# Patient Record
Sex: FEMALE | Race: BLACK OR AFRICAN AMERICAN | Employment: OTHER | URBAN - METROPOLITAN AREA
[De-identification: names, ages, dates, MRNs, and addresses within clinical notes are randomized per-mention and may not be internally consistent; named-entity substitution may affect disease eponyms.]

---

## 2016-06-06 LAB — LDL-C, EXTERNAL: 41

## 2017-02-15 ENCOUNTER — TELEPHONE (OUTPATIENT)
Dept: FAMILY MEDICINE CLINIC | Facility: CLINIC | Age: 82
End: 2017-02-15

## 2017-02-15 RX ORDER — CARVEDILOL 12.5 MG/1
TABLET ORAL
Qty: 60 TAB | Refills: 0 | Status: SHIPPED | OUTPATIENT
Start: 2017-02-15 | End: 2017-09-19 | Stop reason: SDUPTHER

## 2017-02-15 NOTE — TELEPHONE ENCOUNTER
Called about concerns of abnormal U/A. [facility] which will be resolved.

## 2017-03-10 ENCOUNTER — OFFICE VISIT (OUTPATIENT)
Dept: FAMILY MEDICINE CLINIC | Facility: CLINIC | Age: 82
End: 2017-03-10

## 2017-03-10 VITALS
BODY MASS INDEX: 29.33 KG/M2 | HEIGHT: 62 IN | DIASTOLIC BLOOD PRESSURE: 86 MMHG | HEART RATE: 70 BPM | SYSTOLIC BLOOD PRESSURE: 140 MMHG | RESPIRATION RATE: 15 BRPM | TEMPERATURE: 98 F | WEIGHT: 159.4 LBS | OXYGEN SATURATION: 91 %

## 2017-03-10 DIAGNOSIS — Z00.00 MEDICARE ANNUAL WELLNESS VISIT, SUBSEQUENT: ICD-10-CM

## 2017-03-10 DIAGNOSIS — E78.5 HYPERLIPIDEMIA, UNSPECIFIED HYPERLIPIDEMIA TYPE: ICD-10-CM

## 2017-03-10 DIAGNOSIS — N18.4 CKD (CHRONIC KIDNEY DISEASE), STAGE 4 (SEVERE): ICD-10-CM

## 2017-03-10 DIAGNOSIS — E07.9 THYROID MASS: ICD-10-CM

## 2017-03-10 DIAGNOSIS — I10 ESSENTIAL HYPERTENSION: Primary | ICD-10-CM

## 2017-03-10 DIAGNOSIS — I42.9 CARDIOMYOPATHY (HCC): ICD-10-CM

## 2017-03-10 PROBLEM — N18.9 CKD (CHRONIC KIDNEY DISEASE): Status: ACTIVE | Noted: 2017-03-10

## 2017-03-10 RX ORDER — ATORVASTATIN CALCIUM 10 MG/1
TABLET, FILM COATED ORAL
Qty: 90 TAB | Refills: 3 | Status: SHIPPED | OUTPATIENT
Start: 2017-03-10 | End: 2017-09-19 | Stop reason: CLARIF

## 2017-03-10 NOTE — PATIENT INSTRUCTIONS
Schedule of Personalized Health Plan  (Provide Copy to Patient)  The best way to stay healthy is to live a healthy lifestyle. A healthy lifestyle includes regular exercise, eating a well-balanced diet, keeping a healthy weight and not smoking. Regular physical exams and screening tests are another important way to take care of yourself. Preventive exams provided by health care providers can find health problems early when treatment works best and can keep you from getting certain diseases or illnesses. Preventive services include exams, lab tests, screenings, shots, monitoring and information to help you take care of your own health. All people over 65 should have a pneumonia shot. Pneumonia shots are usually only needed once in a lifetime unless your doctor decides differently. All people over 65 should have a yearly flu shot. People over 65 are at medium to high risk for Hepatitis B. Three shots are needed for complete protection. In addition to your physical exam, some screening tests are recommended:    Bone mass measurement (dexa scan) is recommended every two years  Diabetes Mellitus screening is recommended every year. Glaucoma is an eye disease caused by high pressure in the eye. An eye exam is recommended every year. Cardiovascular screening tests that check your cholesterol and other blood fat (lipid) levels are recommended every five years. Colorectal Cancer screening tests help to find pre-cancerous polyps (growths in the colon) so they can be removed before they turn into cancer. Tests ordered for screening depend on your personal and family history risk factors.     Screening for Breast Cancer is recommended yearly with a mammogram.    Screening for Cervical Cancer is recommended every two years (annually for certain risk factors, such as previous history of STD or abnormal PAP in past 7 years), with a Pelvic Exam with PAP    Here is a list of your current Health Maintenance items with a due date:  Health Maintenance   Topic Date Due    GLAUCOMA SCREENING Q2Y  05/03/1999    Pneumococcal 65+ Low/Medium Risk (2 of 2 - PPSV23) 03/10/2018    MEDICARE YEARLY EXAM  03/11/2018    DTaP/Tdap/Td series (2 - Td) 03/10/2027    OSTEOPOROSIS SCREENING (DEXA)  Addressed    ZOSTER VACCINE AGE 60>  Addressed    INFLUENZA AGE 9 TO ADULT  Addressed       Preventing Falls: Care Instructions  Your Care Instructions  Getting around your home safely can be a challenge if you have injuries or health problems that make it easy for you to fall. Loose rugs and furniture in walkways are among the dangers for many older people who have problems walking or who have poor eyesight. People who have conditions such as arthritis, osteoporosis, or dementia also have to be careful not to fall. You can make your home safer with a few simple measures. Follow-up care is a key part of your treatment and safety. Be sure to make and go to all appointments, and call your doctor if you are having problems. It's also a good idea to know your test results and keep a list of the medicines you take. How can you care for yourself at home? Taking care of yourself  · You may get dizzy if you do not drink enough water. To prevent dehydration, drink plenty of fluids, enough so that your urine is light yellow or clear like water. Choose water and other caffeine-free clear liquids. If you have kidney, heart, or liver disease and have to limit fluids, talk with your doctor before you increase the amount of fluids you drink. · Exercise regularly to improve your strength, muscle tone, and balance. Walk if you can. Swimming may be a good choice if you cannot walk easily. · Have your vision and hearing checked each year or any time you notice a change. If you have trouble seeing and hearing, you might not be able to avoid objects and could lose your balance. · Know the side effects of the medicines you take.  Ask your doctor or pharmacist whether the medicines you take can affect your balance. Sleeping pills or sedatives can affect your balance. · Limit the amount of alcohol you drink. Alcohol can impair your balance and other senses. · Ask your doctor whether calluses or corns on your feet need to be removed. If you wear loose-fitting shoes because of calluses or corns, you can lose your balance and fall. · Talk to your doctor if you have numbness in your feet. Preventing falls at home  · Remove raised doorway thresholds, throw rugs, and clutter. Repair loose carpet or raised areas in the floor. · Move furniture and electrical cords to keep them out of walking paths. · Use nonskid floor wax, and wipe up spills right away, especially on ceramic tile floors. · If you use a walker or cane, put rubber tips on it. If you use crutches, clean the bottoms of them regularly with an abrasive pad, such as steel wool. · Keep your house well lit, especially [place], and outside walkways. Use night-lights in areas such as hallways and bathrooms. Add extra light switches or use remote switches (such as switches that go on or off when you clap your hands) to make it easier to turn lights on if you have to get up during the night. · Install sturdy handrails on stairways. · Move items in your cabinets so that the things you use a lot are on the lower shelves (about waist level). · Keep a cordless phone and a flashlight with new batteries by your bed. If possible, put a phone in each of the main rooms of your house, or carry a cell phone in case you fall and cannot reach a phone. Or, you can wear a device around your neck or wrist. You push a button that sends a signal for help. · Wear low-heeled shoes that fit well and give your feet good support. Use footwear with nonskid soles. Check the heels and soles of your shoes for wear. Repair or replace worn heels or soles.   · Do not wear socks without shoes on wood floors. · Walk on the grass when the sidewalks are slippery. If you live in an area that gets snow and ice in the winter, sprinkle salt on slippery steps and sidewalks. Preventing falls in the bath  · Install grab bars and nonskid mats inside and outside your shower or tub and near the toilet and sinks. · Use shower chairs and bath benches. · Use a hand-held shower head that will allow you to sit while showering. · Get into a tub or shower by putting the weaker leg in first. Get out of a tub or shower with your strong side first.  · Repair loose toilet seats and consider installing a raised toilet seat to make getting on and off the toilet easier. · Keep your bathroom door unlocked while you are in the shower. Where can you learn more? Go to http://[employer]ijaya[employer] Innovationsqueta.info/. Enter 0476 79 69 71 in the search box to learn more about \"Preventing Falls: Care Instructions. \"  Current as of: August 4, 2016  Content Version: 11.1  © 7423-9419 [employer]. Care instructions adapted under license by [employer] (which disclaims liability or warranty for this information). If you have questions about a medical condition or this instruction, always ask your healthcare professional. [place] [address] any warranty or liability for your use of this information. Preventing Outdoor Falls: Care Instructions  Your Care Instructions  Worries about falls don't need to keep you indoors. Outdoor activities like walking have big benefits for your health. You will need to watch your step and learn a few safety measures. If you are worried about having a fall outdoors, ask your doctor about exercises, classes, or physical therapy that may help. You can learn ways to gain strength, flexibility, and balance. Ask if it might help to use a cane or walker. You can make your time outdoors safer with a few simple measures. Follow-up care is a key part of your treatment and safety.  Be sure to make and go to all appointments, and call your doctor if you are having problems. It's also a good idea to know your test results and keep a list of the medicines you take. How can you prevent falls outdoors? · Wear shoes with firm soles and low heels. If you have to walk on an icy surface, use grippers that can be worn over your shoes in bad weather. · Be extra careful if weather is bad. Walk on the grass when the sidewalks are slick. If you live in a place that gets snow and ice in the winter, sprinkle salt on slippery stairs and sidewalks. · Be careful getting on or off buses and trains or getting in and out of cars. If handrails are available, use them. · Be careful when you cross the street. Look for crosswalks or places where curb cuts or ramps are present. · Try not to hurry, especially if you are carrying something. · Be cautious in parking lots or garages. There may be curbs or changes in pavement, or the height of the pavement may vary. · Make sure to wear the correct eyeglasses, if you need them. Reading glasses or bifocals can make it harder to see hazards that might be in your way. · If you are walking outdoors for exercise, try to:  ¨ Walk in well-lighted, well-maintained areas. These include high school or college tracks, shopping malls, and public spaces. ¨ Walk with a partner. ¨ Watch out for cracked sidewalks, curbs, changes in the height of the pavement, exposed tree roots, and debris such as fallen leaves or branches. Where can you learn more? Go to http://vijaya-queta.info/. Enter H830 in the search box to learn more about \"Preventing Outdoor Falls: Care Instructions. \"  Current as of: August 4, 2016  Content Version: 11.1  © 7786-0944 [employer]. Care instructions adapted under license by [employer] (which disclaims liability or warranty for this information).  If you have questions about a medical condition or this instruction, always ask your healthcare professional. [place] [address] any warranty or liability for your use of this information. How to Get Up Safely After a Fall: Care Instructions  Your Care Instructions  If you have injuries, health problems, or other reasons that may make it easy for you to fall at home, it is a good idea to learn how to get up safely after a fall. Learning how to get up correctly can help you avoid making an injury worse. Also, knowing what to do if you cannot get up can help you stay safe until help arrives. Follow-up care is a key part of your treatment and safety. Be sure to make and go to all appointments, and call your doctor if you are having problems. It's also a good idea to know your test results and keep a list of the medicines you take. How can you care for yourself after a fall? If you think you can get up  First lie still for a few minutes and think about how you feel. If your body feels okay and you think you can get up safely, follow the rest of the steps below:  1. Look for a chair or other piece of furniture that is close to you. 2. Roll onto your side and rest. Roll by turning your head in the direction you want to roll, move your shoulder and arm, then hip and leg in the same direction. 3. Lie still for a moment to let your blood pressure adjust.  4. Slowly push your upper body up, lift your head, and take a moment to rest.  5. Slowly get up on your hands and knees, and crawl to the chair or other stable piece of furniture. 6. Put your hands on the chair. 7. Move one foot forward, and place it flat on the floor. Your other leg should be bent with the knee on the floor. 8. Rise slowly, turn your body, and sit in the chair. Stay seated for a bit and think about how you feel. Call for help. Even if you feel okay, let someone know what happened to you. You might not know that you have a serious injury. If you cannot get up  1.  If you think you are injured after a fall or you cannot get up, try not to panic. 2. Call out for help. 3. If you have a phone within reach or you have an emergency call device, use it to call for help. 4. If you do not have a phone within reach, try to slide yourself toward it. If you cannot get to the phone, try to slide toward a door or window or a place where you think you can be heard. 5. Ray or use an object to make noise so someone might hear you. 6. If you can reach something that you can use for a pillow, place it under your head. Try to stay warm by covering yourself with a blanket or clothing while you wait for help. When should you call for help? Call 911 anytime you think you may need emergency care. For example, call if:  · You passed out (lost consciousness). · You cannot get up after a fall. · You believe you have serious or life-threatening injuries. Call your doctor now or seek immediate medical care if:  · You have severe pain. · You think you may have passed out but are not sure. · You hit your head or think you may have hit your head but are not sure. · You think your medicines may have caused you to fall. Watch closely for changes in your health, and be sure to contact your doctor if:  · You have fallen, even if you think you are not hurt. Do not feel embarrassed to let your doctor know you have fallen. Your doctor may be able to adjust your medicines or provide other help so you can prevent future falls. Where can you learn more? Go to http://vijaya-queta.info/. Enter K779 in the search box to learn more about \"How to Get Up Safely After a Fall: Care Instructions. \"  Current as of: August 4, 2016  Content Version: 11.1  © 1088-0024 [employer]. Care instructions adapted under license by [employer] (which disclaims liability or warranty for this information).  If you have questions about a medical condition or this instruction, always ask your healthcare professional. [address] any warranty or liability for your use of this information.

## 2017-03-10 NOTE — PROGRESS NOTES
NN AWV:    James Armendariz is a 80 y.o. female and presents for Annual Medicare Wellness Visit. Problem List: Reviewed with patient and discussed risk factors. Patient Active Problem List   Diagnosis Code    CHF (congestive heart failure) (HCC) I50.9    Hypertension I10    Hyperlipidemia E78.5    Thyroid mass E07.9       Current medical providers:  Patient Care Team:  Regina Dalal MD as PCP - General (Family Practice)  Juan Stinson MD as PCP - INTERNAL MEDICINE (Nephrology)  May Lowe MD (General Surgery)  Ayaan Koehler RN as Nurse Navigator    PSH: Reviewed with patient  Past Surgical History:   Procedure Laterality Date    HX CHOLECYSTECTOMY      HX ENDOSCOPY          SH: Reviewed with patient  Social History   Substance Use Topics    Smoking status: Never Smoker    Smokeless tobacco: Never Used    Alcohol use No       FH: Reviewed with patient  Family History   Problem Relation Age of Onset    Heart Disease Sister     Cancer Mother     No Known Problems Father        Medications/Allergies: Reviewed with patient  Current Outpatient Prescriptions on File Prior to Visit   Medication Sig Dispense Refill    carvedilol (COREG) 12.5 mg tablet TAKE 1 TABLET BY MOUTH TWICE DAILY WITH MEALS. GENERIC FOR COREG. 60 Tab 0    furosemide (LASIX) 40 mg tablet TAKE 1 TABLET BY MOUTH TWICE DAILY 180 Tab 0    lisinopril (PRINIVIL, ZESTRIL) 10 mg tablet TAKE 1 TABLET BY MOUTH ONCE DAILY. GENERIC FOR PRINIVIL. 90 Tab 0    hydrALAZINE (APRESOLINE) 100 mg tablet Take 1 Tab by mouth two (2) times a day. 180 Tab 1    VITAMIN D2 50,000 unit capsule two (2) days a week. 3    isosorbide mononitrate ER (IMDUR) 30 mg tablet Take 30 mg by mouth daily.  aspirin delayed-release 81 mg tablet Take 81 mg by mouth daily. No current facility-administered medications on file prior to visit.        No Known Allergies    Objective:  Visit Vitals    /86 (BP 1 Location: Left arm, BP Patient Position: Sitting)    Pulse 70    Temp 98 °F (36.7 °C) (Oral)    Resp 15    Ht 5' 2\" (1.575 m)    Wt 159 lb 6.4 oz (72.3 kg)    SpO2 91%    BMI 29.15 kg/m2    Body mass index is 29.15 kg/(m^2). Assessment of cognitive impairment: Alert and oriented x 3    Depression Screen:   PHQ 2 / 9, over the last two weeks 3/10/2017   Little interest or pleasure in doing things Not at all   Feeling down, depressed or hopeless Not at all   Total Score PHQ 2 0       Fall Risk Assessment:    Fall Risk Assessment, last 12 mths 3/10/2017   Able to walk? Yes   Fall in past 12 months? Yes   Fall with injury? No   Number of falls in past 12 months 1   Fall Risk Score 1       Abuse Screening   Abuse Screening Questionnaire 3/10/2017   Do you ever feel afraid of your partner? N   Are you in a relationship with someone who physically or mentally threatens you? N   Is it safe for you to go home? Y         Functional Ability:   Does the patient exhibit a steady gait? yes   How long did it take the patient to get up and walk from a sitting position? 4 seconds   Is the patient self reliant?  (ie can do own laundry, meals, household chores)  yes     Does the patient handle his/her own medications? yes     Does the patient handle his/her own money? yes     Is the patients home safe (ie good lighting, handrails on stairs and bath, etc.)? yes     Did you notice or did patient express any hearing difficulties? no     Did you notice or did patient express any vision difficulties? No.      Were distance and reading eye charts used?   yes       Advance Care Planning:   Patient was offered the opportunity to discuss advance care planning:  yes     Does patient have an Advance Directive:  Yes   If no, did you provide information on Caring Connections?  no       Plan:      Orders Placed This Encounter    atorvastatin (LIPITOR) 10 mg tablet       Health Maintenance   Topic Date Due    GLAUCOMA SCREENING Q2Y  05/03/1999    Pneumococcal 65+ Low/Medium Risk (2 of 2 - PPSV23) 03/10/2018    MEDICARE YEARLY EXAM  03/11/2018    DTaP/Tdap/Td series (2 - Td) 03/10/2027    OSTEOPOROSIS SCREENING (DEXA)  Addressed    ZOSTER VACCINE AGE 60>  Addressed    INFLUENZA AGE 9 TO ADULT  Addressed     Patient refused Influenza, Pneumonia and DTAP Vaccine. For Glaucoma Screening, Patient will self schedule. *Patient verbalized understanding and agreement with the plan. A copy of the After Visit Summary with personalized health plan was given to the patient today.

## 2017-03-10 NOTE — ACP (ADVANCE CARE PLANNING)
Discussed Advance Medical Directive (AMD) and it's importance. Patient Advance Medical Directive noted on File. No changes as per patient.

## 2017-03-10 NOTE — PROGRESS NOTES
SUBJECTIVE:  Brayden Lozano is a 80y.o. year old female who presents for evaluation of:  Hypertension; Anemia; CHF; and Cholesterol Problem    History of Present Illness:     Her BP is being treated. It is running good. She is on Lipitor for lipids. She is following up with nephrologist for CKD. She denies any problem from her thyroid nodules. She denies any acute symptoms from CHF. Her LVEF has normalized. She was found to have mild anemia. She says she always had mild anemia. No acute blood loss. Past Medical History:   Diagnosis Date    Congestive heart failure, unspecified     Hypercholesterolemia     Hypertension     Sleep apnea      Past Surgical History:   Procedure Laterality Date    HX CHOLECYSTECTOMY      HX ENDOSCOPY          Current Outpatient Prescriptions   Medication Sig    atorvastatin (LIPITOR) 10 mg tablet TAKE 1 TABLET BY MOUTH DAILY    carvedilol (COREG) 12.5 mg tablet TAKE 1 TABLET BY MOUTH TWICE DAILY WITH MEALS. GENERIC FOR COREG.  furosemide (LASIX) 40 mg tablet TAKE 1 TABLET BY MOUTH TWICE DAILY    lisinopril (PRINIVIL, ZESTRIL) 10 mg tablet TAKE 1 TABLET BY MOUTH ONCE DAILY. GENERIC FOR PRINIVIL.  hydrALAZINE (APRESOLINE) 100 mg tablet Take 1 Tab by mouth two (2) times a day.  VITAMIN D2 50,000 unit capsule two (2) days a week.  isosorbide mononitrate ER (IMDUR) 30 mg tablet Take 30 mg by mouth daily.  aspirin delayed-release 81 mg tablet Take 81 mg by mouth daily. No current facility-administered medications for this visit. No Known Allergies     Family History   Problem Relation Age of Onset    Heart Disease Sister     Cancer Mother     No Known Problems Father         Social History   Substance Use Topics    Smoking status: Never Smoker    Smokeless tobacco: Never Used    Alcohol use No       Review of Systems:     Constitutional: No fever, chills, night sweats, malaise.     Cardiovascular: No angina, palpitations, PND, lightheadedness, claudication. Respiratory: No wheeze, pleurisy, hemoptysis, unusual cough or sputum. Gastrointestinal: No nausea/ vomiting, bowel habit change, pain, TOY symptoms, melena, hematochezia, anorexia. Musculoskeletal: No joint swelling/pain. Psychiatric: No agitation, confusion/disorientation, suicidal or homicidal ideation. OBJECTIVE:  Physical Exam:   Constitutional: General Appearance:  well developed, well nourished, nontoxic, in no acute distress. Visit Vitals    /86 (BP 1 Location: Left arm, BP Patient Position: Sitting)    Pulse 70    Temp 98 °F (36.7 °C) (Oral)    Resp 15    Ht 5' 2\" (1.575 m)    Wt 159 lb 6.4 oz (72.3 kg)    SpO2 91%    BMI 29.15 kg/m2   Neck: stable thyroid mass. Pulmonary: Respiratory effort: normal; no dyspnea, no retractions, no accessory muscle use. Auscultation: normal & symmetrical air exchange; no rales, no rhonchi, no wheeze; no rubs    Cardiovascular[de-identified] Palpation of Heart: PMI not displaced or enlarged, no thrills, no heaves. Auscultation of Heart: RRR; Soft systolic murmur on SB, no rubs, no gallops. Neck: carotid arteries- normal volume & without bruit; no JVD. Extremities: no edema, no active varicosity. GI[de-identified] Normal bowel sounds. No masses; no tenderness; no rebound/rigidity; no CVA tenderness. No hepatosplenomegaly. Psychiatric: Oriented to time, place and person. Normal mood, no agitation or anxiety. Normal affect. Pleasant and cooperative. ASSESSMENT:     1. Essential hypertension    2. Hyperlipidemia, unspecified hyperlipidemia type    3. Thyroid mass    4. CKD (chronic kidney disease), stage 4 (severe) (HCC)    5. Cardiomyopathy ([facility] [address]    6. Medicare annual wellness visit, subsequent        PLAN:       Pharmacologic Management: Medications reviewed with the patient. No change. She is getting BW from nephrology. LP, HFP postponed till next OV.      Orders Placed This Encounter    atorvastatin (LIPITOR) 10 mg tablet     Discussed DDx, follow-up & work-up. Discussed risk/benefit & side effect of treatment. Follow up visit as planned, prn sooner. F/U with cardiology and nephrology. Low salt ADA diet & graduated excecise. Health risk from non adherence discussed. Patient voiced understanding. Seen with daughter. I have reviewed the medicare wellness evaluation by Mari Shirley RN. I have discussed with the patient about Health maintenance items. Follow-up Disposition:  Return in about 3 months (around 6/10/2017) for fasting.      Kika Son MD

## 2017-03-10 NOTE — PROGRESS NOTES
Mir Bal is a 80 y.o.  female presents today for office visit for follow up. Pt is in Room # 4      1. Have you been to the ER, urgent care clinic since your last visit? Hospitalized since your last visit? No    2. Have you seen or consulted any other health care providers outside of the [address] [place] [address] since your last visit? Include any pap smears or colon screening. cardiologist and urologist    No Patient Care Coordination Note on file.

## 2017-06-16 ENCOUNTER — OFFICE VISIT (OUTPATIENT)
Dept: FAMILY MEDICINE CLINIC | Facility: CLINIC | Age: 82
End: 2017-06-16

## 2017-06-16 VITALS
RESPIRATION RATE: 16 BRPM | HEIGHT: 62 IN | OXYGEN SATURATION: 91 % | HEART RATE: 67 BPM | SYSTOLIC BLOOD PRESSURE: 134 MMHG | TEMPERATURE: 98.5 F | BODY MASS INDEX: 28.52 KG/M2 | WEIGHT: 155 LBS | DIASTOLIC BLOOD PRESSURE: 82 MMHG

## 2017-06-16 DIAGNOSIS — I50.32 CHRONIC DIASTOLIC CONGESTIVE HEART FAILURE (HCC): ICD-10-CM

## 2017-06-16 DIAGNOSIS — I10 ESSENTIAL HYPERTENSION: Primary | ICD-10-CM

## 2017-06-16 DIAGNOSIS — D53.9 ANEMIA, DEFICIENCY: ICD-10-CM

## 2017-06-16 DIAGNOSIS — E78.5 HYPERLIPIDEMIA, UNSPECIFIED HYPERLIPIDEMIA TYPE: ICD-10-CM

## 2017-06-16 DIAGNOSIS — E07.9 THYROID MASS: ICD-10-CM

## 2017-06-16 DIAGNOSIS — N18.4 CKD (CHRONIC KIDNEY DISEASE), STAGE 4 (SEVERE): ICD-10-CM

## 2017-06-16 NOTE — PROGRESS NOTES
SUBJECTIVE:  Teresita Baires is a 80y.o. year old female who presents for evaluation of:  Hypertension; Cholesterol Problem; CHF; Anemia; and Neck Swelling (thyroid)    History of Present Illness:     Her BP is being treated. It is running good. She is on Lipitor for lipids. She is following up with nephrologist for CKD. She denies any problem from her thyroid nodules. She denies any acute symptoms from CHF. Her LVEF has normalized. She was found to have mild anemia. She says she always had mild anemia. No acute blood loss. Past Medical History:   Diagnosis Date    Congestive heart failure, unspecified     Hypercholesterolemia     Hypertension     Sleep apnea      Past Surgical History:   Procedure Laterality Date    HX CHOLECYSTECTOMY      HX ENDOSCOPY          Current Outpatient Prescriptions   Medication Sig    atorvastatin (LIPITOR) 10 mg tablet TAKE 1 TABLET BY MOUTH DAILY    carvedilol (COREG) 12.5 mg tablet TAKE 1 TABLET BY MOUTH TWICE DAILY WITH MEALS. GENERIC FOR COREG.  furosemide (LASIX) 40 mg tablet TAKE 1 TABLET BY MOUTH TWICE DAILY    lisinopril (PRINIVIL, ZESTRIL) 10 mg tablet TAKE 1 TABLET BY MOUTH ONCE DAILY. GENERIC FOR PRINIVIL.  hydrALAZINE (APRESOLINE) 100 mg tablet Take 1 Tab by mouth two (2) times a day.  VITAMIN D2 50,000 unit capsule two (2) days a week.  isosorbide mononitrate ER (IMDUR) 30 mg tablet Take 30 mg by mouth daily.  aspirin delayed-release 81 mg tablet Take 81 mg by mouth daily. No current facility-administered medications for this visit. No Known Allergies     Family History   Problem Relation Age of Onset    Heart Disease Sister     Cancer Mother     No Known Problems Father         Social History   Substance Use Topics    Smoking status: Never Smoker    Smokeless tobacco: Never Used    Alcohol use No       Review of Systems:     Constitutional: No fever, chills, night sweats, malaise.     Cardiovascular: No angina, palpitations, PND, lightheadedness, claudication. Respiratory: No wheeze, pleurisy, hemoptysis, unusual cough or sputum. Gastrointestinal: No nausea/ vomiting, bowel habit change, pain, TOY symptoms, melena, hematochezia, anorexia. Musculoskeletal: No joint swelling/pain. Psychiatric: No agitation, confusion/disorientation, suicidal or homicidal ideation. OBJECTIVE:  Physical Exam:   Constitutional: General Appearance:  well developed, well nourished, nontoxic, in no acute distress. Visit Vitals    /82 (BP 1 Location: Left arm, BP Patient Position: Sitting)    Pulse 67    Temp 98.5 °F (36.9 °C) (Oral)    Resp 16    Ht 5' 2\" (1.575 m)    Wt 155 lb (70.3 kg)    SpO2 91%    BMI 28.35 kg/m2     Neck: stable thyroid mass. Pulmonary: Respiratory effort: normal; no dyspnea, no retractions, no accessory muscle use. Auscultation: normal & symmetrical air exchange; no rales, no rhonchi, no wheeze; no rubs    Cardiovascular[de-identified] Palpation of Heart: PMI not displaced or enlarged, no thrills, no heaves. Auscultation of Heart: RRR; Soft systolic murmur on SB, no rubs, no gallops. Neck: carotid arteries- normal volume & without bruit; no JVD. Extremities: no edema, no active varicosity. GI[de-identified] Normal bowel sounds. No masses; no tenderness; no rebound/rigidity; no CVA tenderness. No hepatosplenomegaly. Psychiatric: Oriented to time, place and person. Normal mood, no agitation or anxiety. Normal affect. Pleasant and cooperative. ASSESSMENT:     1. Essential hypertension    2. Hyperlipidemia, unspecified hyperlipidemia type    3. CKD (chronic kidney disease), stage 4 (severe)    4. Anemia, deficiency    5. Thyroid mass        PLAN:       Pharmacologic Management: Medications reviewed with the patient. No change. She is getting BW from nephrology also.       Orders Placed This Encounter    LIPID PANEL    CBC WITH AUTOMATED DIFF    HEPATIC FUNCTION PANEL    IRON PROFILE  VITAMIN B12    FOLATE    FERRITIN    RETICULOCYTE COUNT    TSH 3RD GENERATION    T4, FREE     Discussed DDx, follow-up & work-up. Discussed risk/benefit & side effect of treatment. Follow up visit as planned, prn sooner. F/U with cardiology and nephrology. Low salt ADA diet & graduated excecise. Health risk from non adherence discussed. Patient voiced understanding. Seen with daughter. Follow-up Disposition:  Return in about 3 months (around 9/16/2017).      Kitty Manning MD

## 2017-06-16 NOTE — PROGRESS NOTES
Compa Jamison is a 80 y.o.  female presents today for office visit for follow up. Pt is in Room # 6      1. Have you been to the ER, urgent care clinic since your last visit? Hospitalized since your last visit? No    2. Have you seen or consulted any other health care providers outside of the [address] [place] [address] since your last visit? Include any pap smears or colon screening. No    No Patient Care Coordination Note on file.

## 2017-06-19 DIAGNOSIS — E07.9 THYROID MASS: ICD-10-CM

## 2017-06-19 DIAGNOSIS — D53.9 ANEMIA, DEFICIENCY: ICD-10-CM

## 2017-06-19 DIAGNOSIS — E78.5 HYPERLIPIDEMIA, UNSPECIFIED HYPERLIPIDEMIA TYPE: ICD-10-CM

## 2017-07-03 RX ORDER — LISINOPRIL 10 MG/1
TABLET ORAL
Qty: 30 TAB | Refills: 0 | Status: SHIPPED | OUTPATIENT
Start: 2017-07-03 | End: 2017-09-19 | Stop reason: SDUPTHER

## 2017-07-03 RX ORDER — FUROSEMIDE 40 MG/1
TABLET ORAL
Qty: 60 TAB | Refills: 0 | Status: SHIPPED | OUTPATIENT
Start: 2017-07-03 | End: 2017-09-19 | Stop reason: SDUPTHER

## 2017-08-14 ENCOUNTER — TELEPHONE (OUTPATIENT)
Dept: FAMILY MEDICINE CLINIC | Facility: CLINIC | Age: 82
End: 2017-08-14

## 2017-08-15 NOTE — TELEPHONE ENCOUNTER
Called pt and left message. Call back number left and I myself or one of the other nurses will attempt to contact again.     The call was to inform pt results

## 2017-09-19 ENCOUNTER — OFFICE VISIT (OUTPATIENT)
Dept: FAMILY MEDICINE CLINIC | Facility: CLINIC | Age: 82
End: 2017-09-19

## 2017-09-19 ENCOUNTER — HOSPITAL ENCOUNTER (OUTPATIENT)
Dept: LAB | Age: 82
Discharge: HOME | End: 2017-09-19
Payer: MEDICARE

## 2017-09-19 VITALS
WEIGHT: 151 LBS | HEIGHT: 62 IN | OXYGEN SATURATION: 92 % | RESPIRATION RATE: 15 BRPM | SYSTOLIC BLOOD PRESSURE: 134 MMHG | BODY MASS INDEX: 27.79 KG/M2 | DIASTOLIC BLOOD PRESSURE: 80 MMHG | HEART RATE: 72 BPM | TEMPERATURE: 98.1 F

## 2017-09-19 DIAGNOSIS — R82.81 PYURIA: ICD-10-CM

## 2017-09-19 DIAGNOSIS — E07.9 THYROID MASS: ICD-10-CM

## 2017-09-19 DIAGNOSIS — I50.32 CHRONIC DIASTOLIC CONGESTIVE HEART FAILURE (HCC): ICD-10-CM

## 2017-09-19 DIAGNOSIS — E78.5 HYPERLIPIDEMIA, UNSPECIFIED HYPERLIPIDEMIA TYPE: ICD-10-CM

## 2017-09-19 DIAGNOSIS — I10 ESSENTIAL HYPERTENSION: ICD-10-CM

## 2017-09-19 DIAGNOSIS — I50.32 CHRONIC DIASTOLIC CONGESTIVE HEART FAILURE (HCC): Primary | ICD-10-CM

## 2017-09-19 DIAGNOSIS — N18.4 CKD (CHRONIC KIDNEY DISEASE), STAGE 4 (SEVERE): ICD-10-CM

## 2017-09-19 LAB
ALBUMIN SERPL-MCNC: 3.8 G/DL (ref 3.4–5)
ALBUMIN/GLOB SERPL: 1 {RATIO} (ref 0.8–1.7)
ALP SERPL-CCNC: 66 U/L (ref 45–117)
ALT SERPL-CCNC: 18 U/L (ref 13–56)
APPEARANCE UR: CLEAR
AST SERPL-CCNC: 20 U/L (ref 15–37)
BACTERIA URNS QL MICRO: ABNORMAL /HPF
BILIRUB DIRECT SERPL-MCNC: 0.3 MG/DL (ref 0–0.2)
BILIRUB SERPL-MCNC: 0.8 MG/DL (ref 0.2–1)
BILIRUB UR QL: NEGATIVE
CHOLEST SERPL-MCNC: 103 MG/DL
COLOR UR: YELLOW
EPITH CASTS URNS QL MICRO: ABNORMAL /LPF (ref 0–5)
GLOBULIN SER CALC-MCNC: 3.9 G/DL (ref 2–4)
GLUCOSE UR STRIP.AUTO-MCNC: NEGATIVE MG/DL
HDLC SERPL-MCNC: 59 MG/DL (ref 40–60)
HDLC SERPL: 1.7 {RATIO} (ref 0–5)
HGB UR QL STRIP: ABNORMAL
KETONES UR QL STRIP.AUTO: NEGATIVE MG/DL
LDLC SERPL CALC-MCNC: 30.2 MG/DL (ref 0–100)
LEUKOCYTE ESTERASE UR QL STRIP.AUTO: ABNORMAL
LIPID PROFILE,FLP: NORMAL
NITRITE UR QL STRIP.AUTO: NEGATIVE
PH UR STRIP: 6.5 [PH] (ref 5–8)
PROT SERPL-MCNC: 7.7 G/DL (ref 6.4–8.2)
PROT UR STRIP-MCNC: 30 MG/DL
RBC #/AREA URNS HPF: ABNORMAL /HPF (ref 0–5)
SP GR UR REFRACTOMETRY: 1.01 (ref 1–1.03)
T4 FREE SERPL-MCNC: 1.1 NG/DL (ref 0.7–1.5)
TRIGL SERPL-MCNC: 69 MG/DL (ref ?–150)
TSH SERPL DL<=0.05 MIU/L-ACNC: 1.26 UIU/ML (ref 0.36–3.74)
UROBILINOGEN UR QL STRIP.AUTO: 1 EU/DL (ref 0.2–1)
VLDLC SERPL CALC-MCNC: 13.8 MG/DL
WBC URNS QL MICRO: ABNORMAL /HPF (ref 0–4)

## 2017-09-19 PROCEDURE — 80076 HEPATIC FUNCTION PANEL: CPT | Performed by: FAMILY MEDICINE

## 2017-09-19 PROCEDURE — 36415 COLL VENOUS BLD VENIPUNCTURE: CPT | Performed by: FAMILY MEDICINE

## 2017-09-19 PROCEDURE — 84439 ASSAY OF FREE THYROXINE: CPT | Performed by: FAMILY MEDICINE

## 2017-09-19 PROCEDURE — 81001 URINALYSIS AUTO W/SCOPE: CPT | Performed by: FAMILY MEDICINE

## 2017-09-19 PROCEDURE — 80061 LIPID PANEL: CPT | Performed by: FAMILY MEDICINE

## 2017-09-19 PROCEDURE — 87086 URINE CULTURE/COLONY COUNT: CPT | Performed by: FAMILY MEDICINE

## 2017-09-19 PROCEDURE — 84443 ASSAY THYROID STIM HORMONE: CPT | Performed by: FAMILY MEDICINE

## 2017-09-19 RX ORDER — CARVEDILOL 12.5 MG/1
TABLET ORAL
Qty: 180 TAB | Refills: 1 | Status: SHIPPED | OUTPATIENT
Start: 2017-09-19

## 2017-09-19 RX ORDER — HYDRALAZINE HYDROCHLORIDE 100 MG/1
100 TABLET, FILM COATED ORAL 2 TIMES DAILY
Qty: 180 TAB | Refills: 1 | Status: SHIPPED | OUTPATIENT
Start: 2017-09-19

## 2017-09-19 RX ORDER — LISINOPRIL 10 MG/1
TABLET ORAL
Qty: 180 TAB | Refills: 1 | Status: SHIPPED | OUTPATIENT
Start: 2017-09-19 | End: 2017-10-18

## 2017-09-19 RX ORDER — PRAVASTATIN SODIUM 20 MG/1
20 TABLET ORAL
Qty: 90 TAB | Refills: 0 | Status: SHIPPED | OUTPATIENT
Start: 2017-09-19

## 2017-09-19 RX ORDER — FUROSEMIDE 40 MG/1
TABLET ORAL
Qty: 180 TAB | Refills: 1 | Status: SHIPPED | OUTPATIENT
Start: 2017-09-19

## 2017-09-19 NOTE — PROGRESS NOTES
Ajith Cano is a 80 y.o.  female presents today for office visit for routine follow up. Pt is in Room # 4.      1. Have you been to the ER, urgent care clinic since your last visit? Hospitalized since your last visit? No    2. Have you seen or consulted any other health care providers outside of the [address] [place] [address] since your last visit? Include any pap smears or colon screening. Yes Cardiology 8/2017, Dr. Brea Bowen, Nephrology 9/2017    Health Maintenance reviewed - Pt declines influenza vaccine at this time.       Upcoming Appts  Cardiology 1/4/18  Nephrology 3/2017

## 2017-09-19 NOTE — PROGRESS NOTES
SUBJECTIVE:  Zachery Solomon is a 80y.o. year old female who presents for evaluation of:  Hypertension; Cholesterol Problem; Chronic Kidney Disease; Anemia; and Thyroid Problem    History of Present Illness:     Her BP is being treated. It is running good. She is on Lipitor for lipids. She is following up with nephrologist for CKD. She denies any problem from her thyroid nodules. She denies any acute symptoms from CHF. Her LVEF has normalized. She was found to have mild anemia. She says she always had mild anemia. No acute blood loss. Past Medical History:   Diagnosis Date    Congestive heart failure, unspecified     Hypercholesterolemia     Hypertension     Sleep apnea      Past Surgical History:   Procedure Laterality Date    HX CHOLECYSTECTOMY      HX ENDOSCOPY          Current Outpatient Prescriptions   Medication Sig    furosemide (LASIX) 40 mg tablet TAKE 1 TABLET BY MOUTH TWICE DAILY    lisinopril (PRINIVIL, ZESTRIL) 10 mg tablet TAKE 1 TABLET BY MOUTH ONCE DAILY. GENERIC FOR PRINIVIL.  atorvastatin (LIPITOR) 10 mg tablet TAKE 1 TABLET BY MOUTH DAILY    carvedilol (COREG) 12.5 mg tablet TAKE 1 TABLET BY MOUTH TWICE DAILY WITH MEALS. GENERIC FOR COREG.  hydrALAZINE (APRESOLINE) 100 mg tablet Take 1 Tab by mouth two (2) times a day.  VITAMIN D2 50,000 unit capsule Take 50,000 Units by mouth two (2) days a week.  isosorbide mononitrate ER (IMDUR) 30 mg tablet Take 30 mg by mouth daily.  aspirin delayed-release 81 mg tablet Take 81 mg by mouth daily. No current facility-administered medications for this visit.         No Known Allergies     Family History   Problem Relation Age of Onset    Heart Disease Sister     Cancer Mother     No Known Problems Father         Social History   Substance Use Topics    Smoking status: Never Smoker    Smokeless tobacco: Never Used    Alcohol use No       Review of Systems:     Constitutional: No fever, chills, night sweats, malaise. Cardiovascular: No angina, palpitations, PND, lightheadedness, claudication. Respiratory: No wheeze, pleurisy, hemoptysis, unusual cough or sputum. Gastrointestinal: No nausea/ vomiting, bowel habit change, pain, TOY symptoms, melena, hematochezia, anorexia. Musculoskeletal: No joint swelling/pain. Psychiatric: No agitation, confusion/disorientation, suicidal or homicidal ideation. OBJECTIVE:  Physical Exam:   Constitutional: General Appearance:  well developed, well nourished, nontoxic, in no acute distress. Visit Vitals    /80 (BP 1 Location: Left arm, BP Patient Position: Sitting)    Pulse 72    Temp 98.1 °F (36.7 °C) (Oral)    Resp 15    Ht 5' 2\" (1.575 m)    Wt 151 lb (68.5 kg)    SpO2 92%    BMI 27.62 kg/m2     Neck: stable thyroid mass. Pulmonary: Respiratory effort: normal; no dyspnea, no retractions, no accessory muscle use. Auscultation: normal & symmetrical air exchange; no rales, no rhonchi, no wheeze; no rubs    Cardiovascular[de-identified] Palpation of Heart: PMI not displaced or enlarged, no thrills, no heaves. Auscultation of Heart: RRR; Soft systolic murmur on SB, no rubs, no gallops. Neck: carotid arteries- normal volume & without bruit; no JVD. Extremities: no edema, no active varicosity. GI[de-identified] Normal bowel sounds. No masses; no tenderness; no rebound/rigidity; no CVA tenderness. No hepatosplenomegaly. Psychiatric: Oriented to time, place and person. Normal mood, no agitation or anxiety. Normal affect. Pleasant and cooperative. RFP, U/A, CBC done by nephrology- stable except for [place] hematuria and pyuria. ASSESSMENT:     1. Chronic diastolic congestive heart failure ([address]    2. Essential hypertension    3. Hyperlipidemia, unspecified hyperlipidemia type    4. CKD (chronic kidney disease), stage 4 (severe) ([address]    5. Thyroid mass    6. Pyuria        PLAN:       Pharmacologic Management: Medications reviewed with the patient. No change.     She is getting BW from nephrology also. Orders Placed This Encounter    CULTURE, URINE    LIPID PANEL    HEPATIC FUNCTION PANEL    TSH 3RD GENERATION    T4, FREE    URINALYSIS W/ RFLX MICROSCOPIC    furosemide (LASIX) 40 mg tablet    lisinopril (PRINIVIL, ZESTRIL) 10 mg tablet    carvedilol (COREG) 12.5 mg tablet    hydrALAZINE (APRESOLINE) 100 mg tablet     Discussed DDx, follow-up & work-up. Discussed risk/benefit & side effect of treatment. Follow up visit as planned, prn sooner. F/U with cardiology and nephrology. Low salt ADA diet & graduated excecise. Health risk from non adherence discussed. Patient voiced understanding. Seen with daughter. 30 minutes were spent on face to face time with this patient for the aforementioned problems, diagnoses and management plans. >50% of the time was spent counseling and coordinating care. Follow-up Disposition:  Return in about 3 months (around 12/19/2017).      Lia Blackmon MD

## 2017-09-20 NOTE — PROGRESS NOTES
LDL was even lower than before. Will change Lipitor (atorvastatin) to Pravastatin 20 mg nightly. Sent to pharmacy. Will follow. Urine culture is pending.

## 2017-09-21 LAB
BACTERIA SPEC CULT: NORMAL
SERVICE CMNT-IMP: NORMAL

## 2017-10-23 ENCOUNTER — HOSPITAL ENCOUNTER (OUTPATIENT)
Dept: ULTRASOUND IMAGING | Age: 82
Discharge: HOME | End: 2017-10-23
Attending: UROLOGY
Payer: MEDICARE

## 2017-10-23 DIAGNOSIS — R31.29 MICROHEMATURIA: ICD-10-CM

## 2017-10-23 PROCEDURE — 76770 US EXAM ABDO BACK WALL COMP: CPT

## 2017-10-31 NOTE — PROGRESS NOTES
Phoned the patient at [phone] (home) and spoke with the granddaughter, Angelia Kirby), and informed her of the normal ESTHER and she should f/u for the cysto as scheduled, she agreed in understanding, thanked me for the call and it was ended.

## 2018-05-01 ENCOUNTER — DOCUMENTATION ONLY (OUTPATIENT)
Dept: FAMILY MEDICINE CLINIC | Age: 83
End: 2018-05-01

## 2018-05-01 NOTE — PROGRESS NOTES
Called to speak with pt in regards to scheduling a f/u appt, spoke with pt's granddaughter who advised that pt was placed in a long term care facility and is now under the care of the house doctor at that facility. Pt was marked as inactive in [place].

## 2018-05-05 LAB — CREATININE, EXTERNAL: 1.5

## 2020-02-28 NOTE — PROGRESS NOTES
Ioana White,  Please call, her right kidney is smaller than the left on her renal US.   Otherwise her kidneys are normal.  She should follow up as scheduled for her cysto  Thank you be told what is going on